# Patient Record
Sex: FEMALE | Race: WHITE | URBAN - NONMETROPOLITAN AREA
[De-identification: names, ages, dates, MRNs, and addresses within clinical notes are randomized per-mention and may not be internally consistent; named-entity substitution may affect disease eponyms.]

---

## 2017-08-21 ENCOUNTER — NURSE TRIAGE (OUTPATIENT)
Dept: ADMINISTRATIVE | Age: 32
End: 2017-08-21

## 2022-03-28 ENCOUNTER — HOSPITAL ENCOUNTER (OUTPATIENT)
Age: 37
Setting detail: SPECIMEN
Discharge: HOME | End: 2022-03-28

## 2022-03-28 LAB
ALBUMIN SERPL-MCNC: 4.2 G/DL (ref 3.5–5.2)
ALBUMIN/GLOBULIN RATIO: 1.4 (ref 1–2.5)
ALP BLD-CCNC: 88 U/L (ref 35–104)
ALT SERPL-CCNC: 9 U/L (ref 5–33)
ANION GAP SERPL CALCULATED.3IONS-SCNC: 10 MMOL/L (ref 9–17)
AST SERPL-CCNC: 13 U/L
BILIRUB SERPL-MCNC: 0.29 MG/DL (ref 0.3–1.2)
BUN BLDV-MCNC: 12 MG/DL (ref 6–20)
CALCIUM SERPL-MCNC: 9.1 MG/DL (ref 8.6–10.4)
CHLORIDE BLD-SCNC: 107 MMOL/L (ref 98–107)
CHOLESTEROL/HDL RATIO: 2.8
CHOLESTEROL: 145 MG/DL
CO2: 22 MMOL/L (ref 20–31)
CREAT SERPL-MCNC: 0.68 MG/DL (ref 0.5–0.9)
GFR AFRICAN AMERICAN: >60 ML/MIN
GFR NON-AFRICAN AMERICAN: >60 ML/MIN
GFR SERPL CREATININE-BSD FRML MDRD: ABNORMAL ML/MIN/{1.73_M2}
GLUCOSE BLD-MCNC: 100 MG/DL (ref 70–99)
HCT VFR BLD CALC: 41.9 % (ref 36.3–47.1)
HDLC SERPL-MCNC: 51 MG/DL
HEMOGLOBIN: 14.2 G/DL (ref 11.9–15.1)
LDL CHOLESTEROL: 85 MG/DL (ref 0–130)
MCH RBC QN AUTO: 29.6 PG (ref 25.2–33.5)
MCHC RBC AUTO-ENTMCNC: 33.9 G/DL (ref 28.4–34.8)
MCV RBC AUTO: 87.3 FL (ref 82.6–102.9)
NRBC AUTOMATED: 0 PER 100 WBC
PDW BLD-RTO: 12.5 % (ref 11.8–14.4)
PLATELET # BLD: 249 K/UL (ref 138–453)
PMV BLD AUTO: 10.3 FL (ref 8.1–13.5)
POTASSIUM SERPL-SCNC: 5.1 MMOL/L (ref 3.7–5.3)
RBC # BLD: 4.8 M/UL (ref 3.95–5.11)
SODIUM BLD-SCNC: 139 MMOL/L (ref 135–144)
TOTAL PROTEIN: 7.1 G/DL (ref 6.4–8.3)
TRIGL SERPL-MCNC: 44 MG/DL
TSH SERPL DL<=0.05 MIU/L-ACNC: 1.17 MIU/L (ref 0.3–5)
WBC # BLD: 6.7 K/UL (ref 3.5–11.3)

## 2022-04-06 ENCOUNTER — HOSPITAL ENCOUNTER (OUTPATIENT)
Age: 37
Setting detail: SPECIMEN
Discharge: HOME | End: 2022-04-06

## 2022-04-07 LAB
CULTURE: NORMAL
SPECIMEN DESCRIPTION: NORMAL

## 2022-04-08 LAB
CHLAMYDIA BY THIN PREP: NEGATIVE
N. GONORRHOEAE DNA, THIN PREP: NEGATIVE
SPECIMEN DESCRIPTION: NORMAL

## 2022-04-09 LAB
HPV SAMPLE: NORMAL
HPV, GENOTYPE 16: NOT DETECTED
HPV, GENOTYPE 18: NOT DETECTED
HPV, HIGH RISK OTHER: NOT DETECTED
HPV, INTERPRETATION: NORMAL
SPECIMEN DESCRIPTION: NORMAL

## 2022-04-18 LAB — CYTOLOGY REPORT: NORMAL

## 2022-05-17 ENCOUNTER — HOSPITAL ENCOUNTER (OUTPATIENT)
Age: 37
Setting detail: SPECIMEN
Discharge: HOME | End: 2022-05-17

## 2022-05-17 LAB
ANION GAP SERPL CALCULATED.3IONS-SCNC: 11 MMOL/L (ref 9–17)
BUN BLDV-MCNC: 13 MG/DL (ref 6–20)
CALCIUM SERPL-MCNC: 9.4 MG/DL (ref 8.6–10.4)
CHLORIDE BLD-SCNC: 102 MMOL/L (ref 98–107)
CO2: 23 MMOL/L (ref 20–31)
CREAT SERPL-MCNC: 0.64 MG/DL (ref 0.5–0.9)
GFR AFRICAN AMERICAN: >60 ML/MIN
GFR NON-AFRICAN AMERICAN: >60 ML/MIN
GFR SERPL CREATININE-BSD FRML MDRD: ABNORMAL ML/MIN/{1.73_M2}
GLUCOSE BLD-MCNC: 113 MG/DL (ref 70–99)
POTASSIUM SERPL-SCNC: 4 MMOL/L (ref 3.7–5.3)
SODIUM BLD-SCNC: 136 MMOL/L (ref 135–144)
TSH SERPL DL<=0.05 MIU/L-ACNC: 1.28 UIU/ML (ref 0.3–5)

## 2023-04-17 ENCOUNTER — HOSPITAL ENCOUNTER (OUTPATIENT)
Dept: ULTRASOUND IMAGING | Age: 38
Discharge: HOME | End: 2023-04-17

## 2023-04-17 ENCOUNTER — HOSPITAL ENCOUNTER (OUTPATIENT)
Dept: NUCLEAR MEDICINE | Age: 38
Discharge: HOME | End: 2023-04-17

## 2023-04-17 DIAGNOSIS — Z00.6 EXAMINATION FOR NORMAL COMPARISON FOR CLINICAL RESEARCH: ICD-10-CM

## 2023-04-20 ENCOUNTER — OFFICE VISIT (OUTPATIENT)
Dept: SURGERY | Age: 38
End: 2023-04-20
Payer: COMMERCIAL

## 2023-04-20 ENCOUNTER — HOSPITAL ENCOUNTER (OUTPATIENT)
Age: 38
Setting detail: SPECIMEN
Discharge: HOME | End: 2023-04-20

## 2023-04-20 VITALS
HEART RATE: 93 BPM | SYSTOLIC BLOOD PRESSURE: 128 MMHG | DIASTOLIC BLOOD PRESSURE: 90 MMHG | TEMPERATURE: 97.1 F | BODY MASS INDEX: 32.43 KG/M2 | WEIGHT: 165.2 LBS | HEIGHT: 60 IN | OXYGEN SATURATION: 100 %

## 2023-04-20 DIAGNOSIS — K82.8 BILIARY DYSKINESIA: Primary | ICD-10-CM

## 2023-04-20 DIAGNOSIS — Z01.818 PRE-OP TESTING: ICD-10-CM

## 2023-04-20 DIAGNOSIS — R10.11 RUQ ABDOMINAL PAIN: ICD-10-CM

## 2023-04-20 DIAGNOSIS — K82.8 BILIARY DYSKINESIA: ICD-10-CM

## 2023-04-20 LAB
ALBUMIN SERPL-MCNC: 4.2 G/DL (ref 3.5–5.2)
ALBUMIN/GLOBULIN RATIO: 1.3 (ref 1–2.5)
ALP SERPL-CCNC: 82 U/L (ref 35–104)
ALT SERPL-CCNC: 10 U/L (ref 5–33)
AST SERPL-CCNC: 15 U/L
BILIRUB DIRECT SERPL-MCNC: <0.1 MG/DL
BILIRUB INDIRECT SERPL-MCNC: NORMAL MG/DL (ref 0–1)
BILIRUB SERPL-MCNC: 0.3 MG/DL (ref 0.3–1.2)
PROT SERPL-MCNC: 7.4 G/DL (ref 6.4–8.3)

## 2023-04-20 PROCEDURE — 1036F TOBACCO NON-USER: CPT | Performed by: SURGERY

## 2023-04-20 PROCEDURE — G8427 DOCREV CUR MEDS BY ELIG CLIN: HCPCS | Performed by: SURGERY

## 2023-04-20 PROCEDURE — 99204 OFFICE O/P NEW MOD 45 MIN: CPT | Performed by: SURGERY

## 2023-04-20 PROCEDURE — G8417 CALC BMI ABV UP PARAM F/U: HCPCS | Performed by: SURGERY

## 2023-04-20 ASSESSMENT — ENCOUNTER SYMPTOMS
ANAL BLEEDING: 0
SINUS PRESSURE: 0
EYE ITCHING: 0
FACIAL SWELLING: 0
TROUBLE SWALLOWING: 0
VOICE CHANGE: 0
SORE THROAT: 0
CONSTIPATION: 0
CHOKING: 0
EYE REDNESS: 0
COLOR CHANGE: 0
APNEA: 0
EYE PAIN: 0
RHINORRHEA: 0
DIARRHEA: 0
COUGH: 0
CHEST TIGHTNESS: 0
WHEEZING: 0
STRIDOR: 0
ABDOMINAL DISTENTION: 0
ABDOMINAL PAIN: 1
VOMITING: 0
BACK PAIN: 0
BLOOD IN STOOL: 0
PHOTOPHOBIA: 0
SINUS PAIN: 0
EYE DISCHARGE: 0
NAUSEA: 1
SHORTNESS OF BREATH: 0
RECTAL PAIN: 0

## 2023-04-21 ENCOUNTER — TELEPHONE (OUTPATIENT)
Dept: SURGERY | Age: 38
End: 2023-04-21

## 2023-04-21 NOTE — TELEPHONE ENCOUNTER
Per [place] [employer] [id number] - LAPAROSCOPIC CHOLECYSTECTOMY  Pre-authorization is required for non participating providers only.

## 2023-04-25 ENCOUNTER — PREP FOR PROCEDURE (OUTPATIENT)
Dept: SURGERY | Age: 38
End: 2023-04-25

## 2023-04-25 RX ORDER — INDOCYANINE GREEN AND WATER 25 MG
2.5 KIT INJECTION ONCE
Status: CANCELLED | OUTPATIENT
Start: 2023-04-25 | End: 2023-04-25

## 2023-04-25 RX ORDER — SODIUM CHLORIDE 9 MG/ML
INJECTION, SOLUTION INTRAVENOUS CONTINUOUS
Status: CANCELLED | OUTPATIENT
Start: 2023-04-25

## 2023-04-28 ENCOUNTER — ANESTHESIA (OUTPATIENT)
Dept: OPERATING ROOM | Age: 38
End: 2023-04-28
Payer: COMMERCIAL

## 2023-04-28 ENCOUNTER — ANESTHESIA EVENT (OUTPATIENT)
Dept: OPERATING ROOM | Age: 38
End: 2023-04-28
Payer: COMMERCIAL

## 2023-04-28 ENCOUNTER — HOSPITAL ENCOUNTER (OUTPATIENT)
Age: 38
Setting detail: OUTPATIENT SURGERY
Discharge: HOME | End: 2023-04-28
Attending: SURGERY | Admitting: SURGERY
Payer: COMMERCIAL

## 2023-04-28 VITALS
HEIGHT: 60 IN | SYSTOLIC BLOOD PRESSURE: 142 MMHG | DIASTOLIC BLOOD PRESSURE: 77 MMHG | RESPIRATION RATE: 18 BRPM | WEIGHT: 166 LBS | BODY MASS INDEX: 32.59 KG/M2 | TEMPERATURE: 96.4 F | OXYGEN SATURATION: 100 % | HEART RATE: 59 BPM

## 2023-04-28 DIAGNOSIS — Z90.49 S/P CHOLECYSTECTOMY: Primary | ICD-10-CM

## 2023-04-28 DIAGNOSIS — K82.8 BILIARY DYSKINESIA: ICD-10-CM

## 2023-04-28 PROCEDURE — 47562 LAPAROSCOPIC CHOLECYSTECTOMY: CPT | Performed by: SURGERY

## 2023-04-28 PROCEDURE — 7100000000 HC PACU RECOVERY - FIRST 15 MIN: Performed by: SURGERY

## 2023-04-28 PROCEDURE — 2500000003 HC RX 250 WO HCPCS: Performed by: NURSE ANESTHETIST, CERTIFIED REGISTERED

## 2023-04-28 PROCEDURE — 7100000010 HC PHASE II RECOVERY - FIRST 15 MIN: Performed by: SURGERY

## 2023-04-28 PROCEDURE — 88304 TISSUE EXAM BY PATHOLOGIST: CPT

## 2023-04-28 PROCEDURE — 2580000003 HC RX 258: Performed by: NURSE PRACTITIONER

## 2023-04-28 PROCEDURE — 2500000003 HC RX 250 WO HCPCS: Performed by: SURGERY

## 2023-04-28 PROCEDURE — 2709999900 HC NON-CHARGEABLE SUPPLY: Performed by: SURGERY

## 2023-04-28 PROCEDURE — 3600000003 HC SURGERY LEVEL 3 BASE: Performed by: SURGERY

## 2023-04-28 PROCEDURE — 6360000002 HC RX W HCPCS: Performed by: NURSE ANESTHETIST, CERTIFIED REGISTERED

## 2023-04-28 PROCEDURE — C1889 IMPLANT/INSERT DEVICE, NOC: HCPCS | Performed by: SURGERY

## 2023-04-28 PROCEDURE — 7100000011 HC PHASE II RECOVERY - ADDTL 15 MIN: Performed by: SURGERY

## 2023-04-28 PROCEDURE — 6370000000 HC RX 637 (ALT 250 FOR IP): Performed by: SURGERY

## 2023-04-28 PROCEDURE — 7100000001 HC PACU RECOVERY - ADDTL 15 MIN: Performed by: SURGERY

## 2023-04-28 PROCEDURE — 6360000002 HC RX W HCPCS: Performed by: NURSE PRACTITIONER

## 2023-04-28 PROCEDURE — 3600000013 HC SURGERY LEVEL 3 ADDTL 15MIN: Performed by: SURGERY

## 2023-04-28 PROCEDURE — 3700000001 HC ADD 15 MINUTES (ANESTHESIA): Performed by: SURGERY

## 2023-04-28 PROCEDURE — 2500000003 HC RX 250 WO HCPCS: Performed by: NURSE PRACTITIONER

## 2023-04-28 PROCEDURE — 3700000000 HC ANESTHESIA ATTENDED CARE: Performed by: SURGERY

## 2023-04-28 DEVICE — CLIP INT L POLYMER LOK LIG HEM O LOK: Type: IMPLANTABLE DEVICE | Site: GALLBLADDER | Status: FUNCTIONAL

## 2023-04-28 RX ORDER — SODIUM CHLORIDE 0.9 % (FLUSH) 0.9 %
5-40 SYRINGE (ML) INJECTION EVERY 12 HOURS SCHEDULED
Status: DISCONTINUED | OUTPATIENT
Start: 2023-04-28 | End: 2023-04-28 | Stop reason: HOSPADM

## 2023-04-28 RX ORDER — FENTANYL CITRATE 50 UG/ML
25 INJECTION, SOLUTION INTRAMUSCULAR; INTRAVENOUS EVERY 5 MIN PRN
Status: DISCONTINUED | OUTPATIENT
Start: 2023-04-28 | End: 2023-04-28 | Stop reason: HOSPADM

## 2023-04-28 RX ORDER — SODIUM CHLORIDE 0.9 % (FLUSH) 0.9 %
5-40 SYRINGE (ML) INJECTION PRN
Status: DISCONTINUED | OUTPATIENT
Start: 2023-04-28 | End: 2023-04-28 | Stop reason: HOSPADM

## 2023-04-28 RX ORDER — ONDANSETRON 2 MG/ML
INJECTION INTRAMUSCULAR; INTRAVENOUS PRN
Status: DISCONTINUED | OUTPATIENT
Start: 2023-04-28 | End: 2023-04-28 | Stop reason: SDUPTHER

## 2023-04-28 RX ORDER — METOCLOPRAMIDE HYDROCHLORIDE 5 MG/ML
10 INJECTION INTRAMUSCULAR; INTRAVENOUS
Status: DISCONTINUED | OUTPATIENT
Start: 2023-04-28 | End: 2023-04-28 | Stop reason: HOSPADM

## 2023-04-28 RX ORDER — MEPERIDINE HYDROCHLORIDE 25 MG/ML
12.5 INJECTION INTRAMUSCULAR; INTRAVENOUS; SUBCUTANEOUS EVERY 4 HOURS PRN
Status: DISCONTINUED | OUTPATIENT
Start: 2023-04-28 | End: 2023-04-28 | Stop reason: HOSPADM

## 2023-04-28 RX ORDER — PROPOFOL 10 MG/ML
INJECTION, EMULSION INTRAVENOUS PRN
Status: DISCONTINUED | OUTPATIENT
Start: 2023-04-28 | End: 2023-04-28 | Stop reason: SDUPTHER

## 2023-04-28 RX ORDER — FENTANYL CITRATE 50 UG/ML
50 INJECTION, SOLUTION INTRAMUSCULAR; INTRAVENOUS EVERY 5 MIN PRN
Status: DISCONTINUED | OUTPATIENT
Start: 2023-04-28 | End: 2023-04-28 | Stop reason: HOSPADM

## 2023-04-28 RX ORDER — KETOROLAC TROMETHAMINE 30 MG/ML
INJECTION, SOLUTION INTRAMUSCULAR; INTRAVENOUS PRN
Status: DISCONTINUED | OUTPATIENT
Start: 2023-04-28 | End: 2023-04-28 | Stop reason: SDUPTHER

## 2023-04-28 RX ORDER — HYDROCODONE BITARTRATE AND ACETAMINOPHEN 5; 325 MG/1; MG/1
1 TABLET ORAL ONCE
Status: COMPLETED | OUTPATIENT
Start: 2023-04-28 | End: 2023-04-28

## 2023-04-28 RX ORDER — INDOCYANINE GREEN AND WATER 25 MG
2.5 KIT INJECTION ONCE
Status: COMPLETED | OUTPATIENT
Start: 2023-04-28 | End: 2023-04-28

## 2023-04-28 RX ORDER — SODIUM CHLORIDE 9 MG/ML
INJECTION, SOLUTION INTRAVENOUS CONTINUOUS
Status: DISCONTINUED | OUTPATIENT
Start: 2023-04-28 | End: 2023-04-28 | Stop reason: HOSPADM

## 2023-04-28 RX ORDER — LIDOCAINE HYDROCHLORIDE 20 MG/ML
INJECTION, SOLUTION INTRAVENOUS PRN
Status: DISCONTINUED | OUTPATIENT
Start: 2023-04-28 | End: 2023-04-28 | Stop reason: SDUPTHER

## 2023-04-28 RX ORDER — HYDROCODONE BITARTRATE AND ACETAMINOPHEN 5; 325 MG/1; MG/1
1 TABLET ORAL EVERY 4 HOURS PRN
Qty: 18 TABLET | Refills: 0 | Status: SHIPPED | OUTPATIENT
Start: 2023-04-28 | End: 2023-05-01

## 2023-04-28 RX ORDER — ROCURONIUM BROMIDE 10 MG/ML
INJECTION, SOLUTION INTRAVENOUS PRN
Status: DISCONTINUED | OUTPATIENT
Start: 2023-04-28 | End: 2023-04-28 | Stop reason: SDUPTHER

## 2023-04-28 RX ORDER — DEXAMETHASONE SODIUM PHOSPHATE 10 MG/ML
INJECTION, EMULSION INTRAMUSCULAR; INTRAVENOUS PRN
Status: DISCONTINUED | OUTPATIENT
Start: 2023-04-28 | End: 2023-04-28 | Stop reason: SDUPTHER

## 2023-04-28 RX ORDER — BUPIVACAINE HYDROCHLORIDE 5 MG/ML
INJECTION, SOLUTION EPIDURAL; INTRACAUDAL PRN
Status: DISCONTINUED | OUTPATIENT
Start: 2023-04-28 | End: 2023-04-28 | Stop reason: ALTCHOICE

## 2023-04-28 RX ORDER — DIPHENHYDRAMINE HYDROCHLORIDE 50 MG/ML
12.5 INJECTION INTRAMUSCULAR; INTRAVENOUS
Status: DISCONTINUED | OUTPATIENT
Start: 2023-04-28 | End: 2023-04-28 | Stop reason: HOSPADM

## 2023-04-28 RX ORDER — SODIUM CHLORIDE 9 MG/ML
INJECTION, SOLUTION INTRAVENOUS PRN
Status: DISCONTINUED | OUTPATIENT
Start: 2023-04-28 | End: 2023-04-28 | Stop reason: HOSPADM

## 2023-04-28 RX ORDER — FENTANYL CITRATE 50 UG/ML
INJECTION, SOLUTION INTRAMUSCULAR; INTRAVENOUS PRN
Status: DISCONTINUED | OUTPATIENT
Start: 2023-04-28 | End: 2023-04-28 | Stop reason: SDUPTHER

## 2023-04-28 RX ADMIN — SODIUM CHLORIDE: 9 INJECTION, SOLUTION INTRAVENOUS at 09:32

## 2023-04-28 RX ADMIN — Medication 2.5 MG: at 09:32

## 2023-04-28 RX ADMIN — KETOROLAC TROMETHAMINE 30 MG: 30 INJECTION, SOLUTION INTRAMUSCULAR; INTRAVENOUS at 10:40

## 2023-04-28 RX ADMIN — PROPOFOL 150 MG: 10 INJECTION, EMULSION INTRAVENOUS at 10:07

## 2023-04-28 RX ADMIN — FENTANYL CITRATE 50 MCG: 50 INJECTION, SOLUTION INTRAMUSCULAR; INTRAVENOUS at 10:25

## 2023-04-28 RX ADMIN — SODIUM CHLORIDE: 9 INJECTION, SOLUTION INTRAVENOUS at 10:38

## 2023-04-28 RX ADMIN — FENTANYL CITRATE 50 MCG: 50 INJECTION, SOLUTION INTRAMUSCULAR; INTRAVENOUS at 10:07

## 2023-04-28 RX ADMIN — FENTANYL CITRATE 50 MCG: 50 INJECTION, SOLUTION INTRAMUSCULAR; INTRAVENOUS at 10:52

## 2023-04-28 RX ADMIN — ONDANSETRON 4 MG: 2 INJECTION INTRAMUSCULAR; INTRAVENOUS at 10:15

## 2023-04-28 RX ADMIN — SUGAMMADEX 200 MG: 100 INJECTION, SOLUTION INTRAVENOUS at 10:40

## 2023-04-28 RX ADMIN — HYDROCODONE BITARTRATE AND ACETAMINOPHEN 1 TABLET: 5; 325 TABLET ORAL at 12:15

## 2023-04-28 RX ADMIN — LIDOCAINE HYDROCHLORIDE 100 MG: 20 INJECTION, SOLUTION INTRAVENOUS at 10:07

## 2023-04-28 RX ADMIN — ROCURONIUM BROMIDE 40 MG: 10 INJECTION INTRAVENOUS at 10:07

## 2023-04-28 RX ADMIN — CEFOXITIN 2000 MG: 2 INJECTION, POWDER, FOR SOLUTION INTRAVENOUS at 10:15

## 2023-04-28 RX ADMIN — DEXAMETHASONE SODIUM PHOSPHATE 10 MG: 10 INJECTION, EMULSION INTRAMUSCULAR; INTRAVENOUS at 10:15

## 2023-04-28 RX ADMIN — FENTANYL CITRATE 50 MCG: 50 INJECTION, SOLUTION INTRAMUSCULAR; INTRAVENOUS at 10:56

## 2023-04-28 ASSESSMENT — ENCOUNTER SYMPTOMS
COLOR CHANGE: 0
STRIDOR: 0
ABDOMINAL DISTENTION: 0
BACK PAIN: 0
VOMITING: 0
APNEA: 0
PHOTOPHOBIA: 0
SORE THROAT: 0
FACIAL SWELLING: 0
NAUSEA: 1
EYE PAIN: 0
DIARRHEA: 0
RECTAL PAIN: 0
VOICE CHANGE: 0
CHOKING: 0
RHINORRHEA: 0
CONSTIPATION: 0
WHEEZING: 0
EYE DISCHARGE: 0
ABDOMINAL PAIN: 1
TROUBLE SWALLOWING: 0
SHORTNESS OF BREATH: 0
EYE ITCHING: 0
CHEST TIGHTNESS: 0
EYE REDNESS: 0
SINUS PRESSURE: 0
COUGH: 0
BLOOD IN STOOL: 0
SINUS PAIN: 0
ANAL BLEEDING: 0

## 2023-04-28 ASSESSMENT — PAIN DESCRIPTION - DESCRIPTORS: DESCRIPTORS: ACHING;DISCOMFORT

## 2023-04-28 ASSESSMENT — PAIN SCALES - GENERAL
PAINLEVEL_OUTOF10: 4
PAINLEVEL_OUTOF10: 4
PAINLEVEL_OUTOF10: 5

## 2023-04-28 ASSESSMENT — PAIN DESCRIPTION - LOCATION
LOCATION: ABDOMEN

## 2023-04-28 ASSESSMENT — PAIN - FUNCTIONAL ASSESSMENT: PAIN_FUNCTIONAL_ASSESSMENT: 0-10

## 2023-04-28 ASSESSMENT — PAIN SCALES - WONG BAKER: WONGBAKER_NUMERICALRESPONSE: 0

## 2023-04-28 ASSESSMENT — PAIN DESCRIPTION - ORIENTATION: ORIENTATION: RIGHT

## 2023-04-28 NOTE — PROGRESS NOTES
1054: Pt arrives to pacu, responds to verbal stimuli and quickly drifts back to sleep. Pt on room air, respirations easy and unlabored. VSS  1056: Fentanyl administered per crna  1105: Pt resting off and on with eyes closed, easily awakens to voice. States pain 2/10 and tolerable   1124: Pt meets criteria for discharge from pacu, transported to [facility] in stable condition.  VSS

## 2023-04-28 NOTE — PROGRESS NOTES
Pt returned to [facility] room 9. Vitals and assessment as charted. 0.9 infusing, @500ml to count from PACU. Pt has muffin and juice. Family at the bedside. Pt and family verbalized understanding of discharge criteria and call light use. Call light in reach.

## 2023-04-28 NOTE — PROGRESS NOTES
Pt admitted to [facility] room 9 and oriented to unit. SCD sleeves applied. Nares swabbed. Pt verbalized permission for first name, last initial and physicians name on white board. SDS board and discharge criteria explained, pt and family verbalized understanding. Pt denies thoughts of harming self or others. Call light in reach. Family at the bedside.

## 2023-04-28 NOTE — PROGRESS NOTES

## 2023-04-28 NOTE — DISCHARGE INSTRUCTIONS
DR. Coy Push DISCHARGE INSTRUCTIONS    Pt Name: [facility] Record Number: [id number]  Today's Date: 4/28/2023  GENERAL ANESTHESIA OR SEDATION   1. Do not drive or operate hazardous machinery for 24 hours. 2. Do not make important business or personal decisions for 24 hours. 3. Do not drink alcoholic beverages or use tobacco for 24 hours. ACTIVITY INSTRUCTIONS   Rest today. Resume light to normal activity tomorrow. You may resume normal activity tomorrow. Do not engage in strenuous activity that may place stress on your incision. Do not drive for 3-5 days and avoid heavy lifting, tugging, pullings greater than 10-20 lbs until seen in the office. DIET INSTRUCTIONS   Begin with clear liquids. If not nauseated, may increase to a low-fat diet when you desire. Greasy and spicy foods are not advised. Regular diet as tolerated. MEDICATIONS   [facility] Take as directed. Do not drink alcohol or drive while taking pain medications. You may experience dizziness or drowsiness with these medications. You may also experience constipation which can be relieved with stool softners or laxatives. You may resume your daily prescription medication schedule unless otherwise specified. Do not take 325mg Aspirin or other blood thinners such as Coumadin or Plavix for 5 days. WOUND & DRESSING INSTRUCTIONS   Always ensure you and your care giver clean hands before and after caring for the wound. Keep dressing clean and dry for 48 hours. Change when soiled or wet. Allow steri-strips to fall off on their own if present, allow surgical glue to peel off on its own  Ice operative site for 20 minutes 4 times a day. May wash over incision in shower in 48 hours, but do not soak in a bath. ABDOMINAL & LAPAROSCOPIC PROCEDURES   1. You are encouraged to get up and move around as this helps with the circulation and speeds up the healing process.   2. Breath deeply and cough from time

## 2023-04-28 NOTE — ANESTHESIA PRE PROCEDURE
Department of Anesthesiology  Preprocedure Note       Name:  Dewayne Potts   Age:  30 y.o.  :  1985                                          MRN:  [id number]         Date:  2023      Surgeon: Lawson Randall): Heriberto Gonzalez MD    Procedure: Procedure(s):  Laparoscopic Cholecystectomy possible Open    Medications prior to admission:   Prior to Admission medications    Not on File       Current medications:    Current Facility-Administered Medications   Medication Dose Route Frequency Provider Last Rate Last Admin    cefOXitin (MEFOXIN) 2,000 mg in sodium chloride 0.9 % 50 mL IVPB (mini-bag)  2,000 mg IntraVENous On Call to [address], APRN - CNP        0.9 % sodium chloride infusion   IntraVENous Continuous Aileen Ada, APRN - CNP 20 mL/hr at 23 0932 New Bag at 23 0932       Allergies: Allergies   Allergen Reactions    Amoxicillin      Other reaction(s): Hives       Problem List:  There is no problem list on file for this patient. Past Medical History:  History reviewed. No pertinent past medical history.     Past Surgical History:        Procedure Laterality Date    MOUTH SURGERY      TUBAL LIGATION         Social History:    Social History     Tobacco Use    Smoking status: Never    Smokeless tobacco: Never   Substance Use Topics    Alcohol use: Not Currently                                Counseling given: Not Answered      Vital Signs (Current):   Vitals:    23 0856   BP: (!) 145/90   Pulse: 85   Resp: 20   Temp: 97 °F (36.1 °C)   TempSrc: Tympanic   SpO2: 100%   Weight: 166 lb (75.3 kg)   Height: 5' (1.524 m)                                              BP Readings from Last 3 Encounters:   23 (!) 145/90   23 (!) 128/90       NPO Status: Time of last liquid consumption:                         Time of last solid consumption:                         Date of last liquid consumption: 23                        Date of last solid food

## 2023-04-28 NOTE — ANESTHESIA POSTPROCEDURE EVALUATION
Department of Anesthesiology  Postprocedure Note    Patient: Maria Eugenia Renee  MRN: [id number]  YOB: 1985  Date of evaluation: 4/28/2023      Procedure Summary     Date: 04/28/23 Room / Location: [address] [facility]    Anesthesia Start: 1004 Anesthesia Stop: 8644    Procedure: Laparoscopic Cholecystectomy possible Open (Abdomen) Diagnosis:       Biliary dyskinesia      (Biliary dyskinesia [K82.8])    Surgeons: Marck Epperson MD Responsible Provider: Rose Guthrie MD    Anesthesia Type: general ASA Status: 2          Anesthesia Type: No value filed. Ishan Phase I: Ishan Score: 9    Ishan Phase II:        Anesthesia Post Evaluation    Patient location during evaluation: PACU  Patient participation: complete - patient participated  Level of consciousness: awake and alert  Airway patency: patent  Nausea & Vomiting: no nausea and no vomiting  Complications: no  Cardiovascular status: hemodynamically stable  Respiratory status: acceptable  Hydration status: euvolemic      [facility] [address]  POST-ANESTHESIA NOTE       Name:  Maria Eugenia Renee                                         Age:  30 y.o.   MRN:  [id number]      Last Vitals:  BP (!) 140/87   Pulse 57   Temp 98.6 °F (37 °C) (Temporal)   Resp 18   Ht 5' (1.524 m)   Wt 166 lb (75.3 kg)   SpO2 96%   BMI 32.42 kg/m²   Patient Vitals for the past 4 hrs:   BP Temp Temp src Pulse Resp SpO2 Height Weight   04/28/23 1120 (!) 140/87 -- -- 57 18 96 % -- --   04/28/23 1115 137/79 -- -- 62 20 98 % -- --   04/28/23 1110 133/75 -- -- 73 17 98 % -- --   04/28/23 1105 133/70 -- -- 72 20 97 % -- --   04/28/23 1055 -- -- -- 73 20 96 % -- --   04/28/23 1054 139/70 98.6 °F (37 °C) Temporal 70 16 93 % -- --   04/28/23 0856 (!) 145/90 97 °F (36.1 °C) Tympanic 85 20 100 % 5' (1.524 m) 166 lb (75.3 kg)       Level of Consciousness:  Awake    Respiratory:  Stable    Oxygen Saturation:  Stable    Cardiovascular:  Stable    Hydration:  Adequate    PONV:

## 2023-04-28 NOTE — OP NOTE
[facility] 62  RECORD OF OPERATION  PATIENT NAME: David RECORD NO. [id number]  SURGEON: Oscar Gleason MD  Primary Care Physician: JOSSIE Hernandez CNP     PROCEDURE PERFORMED:  4/28/2023  PREOPERATIVE DIAGNOSIS: biliary dyskinesia   POSTOPERATIVE DIAGNOSIS: Same, path pending  PROCEDURE PERFORMED:  Laparoscopic cholecystectomy. SURGEON:  Dr. Ravinder Benoitaming:  General with local.    DISCUSSION: Dulce Arthur is a 30y.o. year old female who was seen in evaluation at request of JOSSIE Hernandez CNP regarding signs and symptoms, gallbladder disease, radiographic findings of biliary dyskinesia. After history and physical examination was performed treatment options were discussed with the patient. The risks and benefits of surgery were explained to the patient and all questions were answered. The patient elected to proceed with laparoscopic possible open cholecystectomy and informed consent was obtained. OPERATIVE FINDINGS:    -thin walled gallbladder   - critical view of safety was achieved demonstrating two and only two distinct structures entering the gallbladder and the cystic plate was cleared. PROCEDURE:  The patient was seen in the preoperative holding area where informed consent was reviewed. They were taken to the operating room, placed on the operating table in supine position. After induction of adequate anesthesia a formal timeout was performed and they were prepped and draped in the normal sterile fashion. Local anesthetic was instilled at Mendoza's point and a skin incision was made with an 11 blade scalpel. A Veress needle was inserted, there was good return of air and good fluid drop. Pneumoperitoneum was then established to a pressure of 15mm of Mercury. A 5mm Opti view trocar was then used to enter the abdomen at this site. Initial laparoscopy demonstrated no unexpected findings.  Under direct visualization the remaining operative trocars

## 2023-04-28 NOTE — H&P
Subjective:      Patient ID: Liz Gannon is a 30 y.o. female. No chief complaint on file. 31-year-old female who for the last year and a half has been having intermittent episodes of nausea and right upper quadrant pain usually after eating food most significant foods that cause this are greasy fatty foods or cheese. No nausea. No fevers chills no weight loss. She had a work-up including ultrasound that was normal, and a HIDA scan demonstrating ejection fraction less than 25%. There are no alleviating factors as mentioned before aggravating factors include eating. She is otherwise healthy. No past medical history on file. Past Surgical History:   Procedure Laterality Date    MOUTH SURGERY      TUBAL LIGATION       Social History     Socioeconomic History    Marital status:      Spouse name: Not on file    Number of children: Not on file    Years of education: Not on file    Highest education level: Not on file   Occupational History    Not on file   Tobacco Use    Smoking status: Never    Smokeless tobacco: Never   Vaping Use    Vaping Use: Never used   Substance and Sexual Activity    Alcohol use: Not Currently    Drug use: Never    Sexual activity: Yes   Other Topics Concern    Not on file   Social History Narrative    Not on file     Social Determinants of Health     Financial Resource Strain: Not on file   Food Insecurity: Not on file   Transportation Needs: Not on file   Physical Activity: Not on file   Stress: Not on file   Social Connections: Not on file   Intimate Partner Violence: Not on file   Housing Stability: Not on file     Family History   Problem Relation Age of Onset    Diabetes Brother          Review of Systems   Constitutional:  Negative for activity change, appetite change, chills, diaphoresis, fatigue, fever and unexpected weight change.    HENT:  Negative for congestion, dental problem, drooling, ear discharge, ear pain, facial swelling, hearing loss, mouth sores,

## 2023-05-11 ENCOUNTER — OFFICE VISIT (OUTPATIENT)
Dept: SURGERY | Age: 38
End: 2023-05-11

## 2023-05-11 VITALS
WEIGHT: 164.1 LBS | HEIGHT: 60 IN | SYSTOLIC BLOOD PRESSURE: 118 MMHG | BODY MASS INDEX: 32.22 KG/M2 | OXYGEN SATURATION: 99 % | HEART RATE: 89 BPM | TEMPERATURE: 97.5 F | RESPIRATION RATE: 16 BRPM | DIASTOLIC BLOOD PRESSURE: 60 MMHG

## 2023-05-11 DIAGNOSIS — Z09 POSTOPERATIVE EXAMINATION: Primary | ICD-10-CM

## 2023-05-11 PROCEDURE — 99024 POSTOP FOLLOW-UP VISIT: CPT | Performed by: NURSE PRACTITIONER

## 2023-05-11 ASSESSMENT — ENCOUNTER SYMPTOMS
SHORTNESS OF BREATH: 0
NAUSEA: 0
VOMITING: 0
ABDOMINAL PAIN: 0

## 2023-05-11 NOTE — PROGRESS NOTES
[facility] [address] [place] [facility] [address] [facility] [address]  Dept: [phone]  Dept Fax: [phone]  Loc: [phone]    Visit Date: 5/11/2023       Aida Simon is a 30 y.o. female who presents today for:  Chief Complaint   Patient presents with    Surgical Consult     S/p Laparoscopic cholecystectomy 4/28/23       HPI:     Pedro Pablo Rowland presents today for a post op check. Patient doing well post cholecystectomy, states that all biliary colic symptoms have resolved. Incisional pain has improved and is no longer taking pain medication. The incisions look good, no signs of infection, no drainage no erythema. Denies N&V, tolerating meals and having normal bowel movements. May return to work with no restrictions in one week. Surgical pathology reviewed and discussed. FINAL DIAGNOSIS:   Gallbladder, cholecystectomy:     Chronic cholecystitis. History reviewed. No pertinent past medical history. Past Surgical History:   Procedure Laterality Date    CHOLECYSTECTOMY, LAPAROSCOPIC N/A 4/28/2023    Laparoscopic Cholecystectomy possible Open performed by Tigre Lewis MD at [address] [place] [address]       Family History   Problem Relation Age of Onset    Diabetes Brother      Social History     Tobacco Use    Smoking status: Never    Smokeless tobacco: Never   Substance Use Topics    Alcohol use: Not Currently        No current outpatient medications on file. No current facility-administered medications for this visit. Allergies   Allergen Reactions    Amoxicillin      Other reaction(s): Hives       Subjective:      Review of Systems   Constitutional:  Negative for chills and fever. Respiratory:  Negative for shortness of breath. Cardiovascular:  Negative for chest pain. Gastrointestinal:  Negative for abdominal pain, nausea and vomiting. Skin:  Positive for wound (incisions).      Objective:     BP

## 2025-06-27 ENCOUNTER — HOSPITAL ENCOUNTER (OUTPATIENT)
Dept: WOMENS IMAGING | Age: 40
Discharge: HOME | End: 2025-06-27
Payer: COMMERCIAL

## 2025-06-27 VITALS — BODY MASS INDEX: 36.32 KG/M2 | WEIGHT: 185 LBS | HEIGHT: 60 IN

## 2025-06-27 DIAGNOSIS — Z80.3 FAMILY HISTORY OF MALIGNANT NEOPLASM OF BREAST: ICD-10-CM

## 2025-06-27 PROCEDURE — 77067 SCR MAMMO BI INCL CAD: CPT

## (undated) DEVICE — BAG SPEC REM 224ML W4XL6IN DIA10MM 1 HND GYN DISP ENDOPCH

## (undated) DEVICE — GENERAL LAPAROSCOPY-LF: Brand: MEDLINE INDUSTRIES, INC.

## (undated) DEVICE — PUMP SUC IRR TBNG L10FT W/ HNDPC ASSEMB STRYKEFLOW 2

## (undated) DEVICE — TROCAR: Brand: KII FIOS FIRST ENTRY

## (undated) DEVICE — INSUFFLATION NEEDLE TO ESTABLISH PNEUMOPERITONEUM.: Brand: INSUFFLATION NEEDLE

## (undated) DEVICE — UNIVERSAL MONOPOLAR LAPAROSCOPIC CABLE 10FT, 4MM PIN CONNECTOR: Brand: CONMED

## (undated) DEVICE — ADHESIVE SKIN CLSR 0.7ML TOP DERMBND ADV

## (undated) DEVICE — TUBING INSUFFLATION SMK EVAC HI FLO SET PNEUMOCLEAR

## (undated) DEVICE — SUTURE VCRL + SZ 0 L27IN ABSRB VLT L26MM UR-6 5/8 CIR VCP603H

## (undated) DEVICE — GLOVE ORANGE PI 7   MSG9070

## (undated) DEVICE — TROCAR: Brand: KII® SLEEVE